# Patient Record
Sex: FEMALE | Race: WHITE
[De-identification: names, ages, dates, MRNs, and addresses within clinical notes are randomized per-mention and may not be internally consistent; named-entity substitution may affect disease eponyms.]

---

## 2017-01-20 NOTE — DIAGNOSTIC IMAGING REPORT
Report Submission Date: 2017 12:08:17 AM CST







Patient ~ Study  

 

Name: SHELL REYNOSO ~ Date: 2017 3:57:16 PM CST

 

MRN: [id number] ~ Modality Type: CR

 

Gender: F ~ Description: LOWER EXTREMITY

 

: [birth year] ~ Institution: [facility]

 

Physician: NICHOLAS ALDANA  ~ ~ ~





~

Right knee - three views 

Clinical history: ~Pain for about 3 months. 

Findings: ~Examination of the right knee in AP, lateral and sunrise views 
demonstrates degenerative changes in the knee with narrowing of the joint space 
worse medially. ~There is mild prominence of the tibial spines. ~There is no 
evident fracture or joint effusion. ~Patellofemoral space is narrowed. 

Impression: 

1. ~Degenerative changes. 

2. ~No fracture.

~

Electronically signed on 2017 12:08:17 AM CST by:

Vipul PADILLA

## 2017-01-20 NOTE — DIAGNOSTIC IMAGING REPORT
Report Submission Date: 2017 12:03:48 AM CST







Patient ~ Study  

 

Name: SHELL REYNOSO ~ Date: 2017 3:47:08 PM CST

 

MRN: [id number] ~ Modality Type: CR

 

Gender: F ~ Description: SPINE

 

: [birth year] ~ Institution: [facility]

 

Physician: NICHOLAS ALDANA  ~ ~ ~





~

Lumbar spine - three views 

Clinical history: ~Worsening low back pain for 3 years. 

Findings: ~Examination of the lumbar spine in AP, lateral and lateral coned-
down views demonstrates spondylosis with small anterior and lateral osteophytes 
in the mid and lower lumbar vertebrae. ~There is mild dextrocurvature of the 
lumbar spine that may be related to muscle spasm or patient positioning. ~The 
pedicles are intact and the paravertebral soft tissues are within normal 
limits. ~The facet joints are narrowed in the mid and lower lumbar spine. ~
There is no evident fracture and no lytic or blastic lesion. 

Impression: 

1. ~Spondylosis. 

2. ~Mild dextroscoliosis.

~

Electronically signed on 2017 12:03:48 AM CST by:

Vipul PADILLA

## 2018-12-10 ENCOUNTER — HOSPITAL ENCOUNTER (OUTPATIENT)
Dept: HOSPITAL 44 - LAB | Age: 68
End: 2018-12-10
Attending: FAMILY MEDICINE
Payer: COMMERCIAL

## 2018-12-10 DIAGNOSIS — E11.9: Primary | ICD-10-CM

## 2018-12-10 PROCEDURE — 83036 HEMOGLOBIN GLYCOSYLATED A1C: CPT

## 2018-12-10 PROCEDURE — 36415 COLL VENOUS BLD VENIPUNCTURE: CPT

## 2019-06-12 ENCOUNTER — HOSPITAL ENCOUNTER (OUTPATIENT)
Dept: HOSPITAL 44 - LAB | Age: 69
Discharge: HOME | End: 2019-06-12
Attending: FAMILY MEDICINE
Payer: COMMERCIAL

## 2019-06-12 DIAGNOSIS — E11.9: Primary | ICD-10-CM

## 2019-06-12 PROCEDURE — 36415 COLL VENOUS BLD VENIPUNCTURE: CPT

## 2019-06-12 PROCEDURE — 83036 HEMOGLOBIN GLYCOSYLATED A1C: CPT

## 2019-09-11 ENCOUNTER — HOSPITAL ENCOUNTER (OUTPATIENT)
Dept: HOSPITAL 44 - LAB | Age: 69
End: 2019-09-11
Attending: FAMILY MEDICINE
Payer: COMMERCIAL

## 2019-09-11 DIAGNOSIS — I10: ICD-10-CM

## 2019-09-11 DIAGNOSIS — E11.9: Primary | ICD-10-CM

## 2019-09-11 LAB
BASOPHILS NFR BLD: 0.3 % (ref 0–1.5)
EGFR (NON-AFRICAN): > 60
HDL: 30 MG/DL (ref 40–?)
MCV RBC AUTO: 80 FL (ref 80–100)
NEUTROPHILS #: 3.3 # K/UL (ref 1.4–7.7)

## 2019-09-11 PROCEDURE — 80061 LIPID PANEL: CPT

## 2019-09-11 PROCEDURE — 80053 COMPREHEN METABOLIC PANEL: CPT

## 2019-09-11 PROCEDURE — 85025 COMPLETE CBC W/AUTO DIFF WBC: CPT

## 2019-09-11 PROCEDURE — 36415 COLL VENOUS BLD VENIPUNCTURE: CPT

## 2019-11-11 ENCOUNTER — HOSPITAL ENCOUNTER (OUTPATIENT)
Dept: HOSPITAL 44 - LAB | Age: 69
End: 2019-11-11
Attending: FAMILY MEDICINE
Payer: COMMERCIAL

## 2019-11-11 DIAGNOSIS — E11.9: ICD-10-CM

## 2019-11-11 DIAGNOSIS — D50.9: Primary | ICD-10-CM

## 2019-11-11 LAB
BASOPHILS NFR BLD: 0.9 % (ref 0–1.5)
MCV RBC AUTO: 87 FL (ref 80–100)
NEUTROPHILS #: 2.9 # K/UL (ref 1.4–7.7)

## 2019-11-11 PROCEDURE — 85025 COMPLETE CBC W/AUTO DIFF WBC: CPT

## 2019-11-11 PROCEDURE — 83036 HEMOGLOBIN GLYCOSYLATED A1C: CPT

## 2019-11-11 PROCEDURE — 36415 COLL VENOUS BLD VENIPUNCTURE: CPT
